# Patient Record
Sex: FEMALE | Race: WHITE
[De-identification: names, ages, dates, MRNs, and addresses within clinical notes are randomized per-mention and may not be internally consistent; named-entity substitution may affect disease eponyms.]

---

## 2019-06-02 ENCOUNTER — HOSPITAL ENCOUNTER (INPATIENT)
Dept: HOSPITAL 54 - TELE1 | Age: 59
LOS: 3 days | Discharge: HOME | DRG: 201 | End: 2019-06-05
Attending: INTERNAL MEDICINE | Admitting: INTERNAL MEDICINE
Payer: COMMERCIAL

## 2019-06-02 VITALS — BODY MASS INDEX: 29.82 KG/M2 | HEIGHT: 67 IN | WEIGHT: 190 LBS

## 2019-06-02 DIAGNOSIS — E66.9: ICD-10-CM

## 2019-06-02 DIAGNOSIS — E78.5: ICD-10-CM

## 2019-06-02 DIAGNOSIS — I48.0: Primary | ICD-10-CM

## 2019-06-02 DIAGNOSIS — F41.9: ICD-10-CM

## 2019-06-02 DIAGNOSIS — E87.6: ICD-10-CM

## 2019-06-02 PROCEDURE — G0378 HOSPITAL OBSERVATION PER HR: HCPCS

## 2019-06-02 NOTE — NUR
TELE RN OPENING NOTES



PATIENT D/A FROM [facility], PT ARRIVED VIA AMBULANCE ALONG WITH HER MOTHER. PT A/O 
X3, ABLE TO MAKE NEEDS KNOWN. DENIES ANY PAIN OR SOB. ON RA, RR EVEN AND UNLABORED. PATIENT 
PLACED ON TELE MONITOR SR WITH HR 70S. VITAL SIGNS: /75, HR 73, TEMP 98, RR 18, SPO2 
96%. IV SITE LEFT FA 22G, PATENT AND INTACT, SITE C/D/I. PT REFUSED BEDBATH. PATIENT 
ORIENTED TO ROOM. MRSA SWAB DONE. PHYSICAL ASSESSMENT DONE AND RECORDED. PATIENT AND FAMILY 
MADE COMFORTABLE. SAFETY MEASURES IN PLACE. CALL LIGHT WITHIN REACH. BED LOCKED AND IN 
LOWEST POSITION. PER PATIENT SHE IS THE PRIMARY AND ONLY CAREGIVER TO HER MOM, NOBODY CAN 
TAKE CARE OF HER MOM, PATIENT STATED HER MOM NEEDS TO STAY WITH HER AT BEDSIDE, MOTHER 
DEPENDENT, CHARGE NURSE MADE AWARE AND MD MADE AWARE OF ADMISSION, PT STATED SHE WANTS 
DNR/DNI CODE STATUS. MD MADE AWARE. AWAITING FOR ADMITTING ORDERS. WILL MONITOR PT CLOSELY.

## 2019-06-03 VITALS — DIASTOLIC BLOOD PRESSURE: 74 MMHG | SYSTOLIC BLOOD PRESSURE: 117 MMHG

## 2019-06-03 VITALS — SYSTOLIC BLOOD PRESSURE: 103 MMHG | DIASTOLIC BLOOD PRESSURE: 68 MMHG

## 2019-06-03 VITALS — DIASTOLIC BLOOD PRESSURE: 75 MMHG | SYSTOLIC BLOOD PRESSURE: 115 MMHG

## 2019-06-03 VITALS — DIASTOLIC BLOOD PRESSURE: 62 MMHG | SYSTOLIC BLOOD PRESSURE: 93 MMHG

## 2019-06-03 VITALS — SYSTOLIC BLOOD PRESSURE: 119 MMHG | DIASTOLIC BLOOD PRESSURE: 74 MMHG

## 2019-06-03 VITALS — DIASTOLIC BLOOD PRESSURE: 76 MMHG | SYSTOLIC BLOOD PRESSURE: 108 MMHG

## 2019-06-03 VITALS — DIASTOLIC BLOOD PRESSURE: 69 MMHG | SYSTOLIC BLOOD PRESSURE: 112 MMHG

## 2019-06-03 LAB
ALBUMIN SERPL BCP-MCNC: 3.7 G/DL (ref 3.4–5)
ALP SERPL-CCNC: 68 U/L (ref 46–116)
ALT SERPL W P-5'-P-CCNC: 50 U/L (ref 12–78)
AST SERPL W P-5'-P-CCNC: 19 U/L (ref 15–37)
BASOPHILS # BLD AUTO: 0.1 /CMM (ref 0–0.2)
BASOPHILS NFR BLD AUTO: 0.9 % (ref 0–2)
BILIRUB SERPL-MCNC: 0.9 MG/DL (ref 0.2–1)
BUN SERPL-MCNC: 14 MG/DL (ref 7–18)
CALCIUM SERPL-MCNC: 9.1 MG/DL (ref 8.5–10.1)
CHLORIDE SERPL-SCNC: 105 MMOL/L (ref 98–107)
CHOLEST SERPL-MCNC: 209 MG/DL (ref ?–200)
CO2 SERPL-SCNC: 26 MMOL/L (ref 21–32)
CREAT SERPL-MCNC: 0.9 MG/DL (ref 0.6–1.3)
EOSINOPHIL NFR BLD AUTO: 0.3 % (ref 0–6)
GLUCOSE SERPL-MCNC: 97 MG/DL (ref 74–106)
HCT VFR BLD AUTO: 44 % (ref 33–45)
HDLC SERPL-MCNC: 46 MG/DL (ref 40–60)
HGB BLD-MCNC: 14.7 G/DL (ref 11.5–14.8)
LDLC SERPL DIRECT ASSAY-MCNC: 141 MG/DL (ref 0–99)
LYMPHOCYTES NFR BLD AUTO: 2.1 /CMM (ref 0.8–4.8)
LYMPHOCYTES NFR BLD AUTO: 24.1 % (ref 20–44)
MAGNESIUM SERPL-MCNC: 2.1 MG/DL (ref 1.8–2.4)
MCHC RBC AUTO-ENTMCNC: 34 G/DL (ref 31–36)
MCV RBC AUTO: 90 FL (ref 82–100)
MONOCYTES NFR BLD AUTO: 0.6 /CMM (ref 0.1–1.3)
MONOCYTES NFR BLD AUTO: 6.6 % (ref 2–12)
NEUTROPHILS # BLD AUTO: 5.8 /CMM (ref 1.8–8.9)
NEUTROPHILS NFR BLD AUTO: 68.1 % (ref 43–81)
PHOSPHATE SERPL-MCNC: 3.5 MG/DL (ref 2.5–4.9)
PLATELET # BLD AUTO: 251 /CMM (ref 150–450)
POTASSIUM SERPL-SCNC: 3.4 MMOL/L (ref 3.5–5.1)
PROT SERPL-MCNC: 7.6 G/DL (ref 6.4–8.2)
RBC # BLD AUTO: 4.83 MIL/UL (ref 4–5.2)
SODIUM SERPL-SCNC: 141 MMOL/L (ref 136–145)
TRIGL SERPL-MCNC: 116 MG/DL (ref 30–150)
TSH SERPL DL<=0.005 MIU/L-ACNC: 2.93 UIU/ML (ref 0.36–3.74)
WBC NRBC COR # BLD AUTO: 8.5 K/UL (ref 4.3–11)

## 2019-06-03 RX ADMIN — RIVAROXABAN SCH MG: 10 TABLET, FILM COATED ORAL at 17:03

## 2019-06-03 RX ADMIN — METOPROLOL TARTRATE SCH MG: 25 TABLET, FILM COATED ORAL at 17:03

## 2019-06-03 NOTE — NUR
RN TELE CLOSING NOTES



PATIENT AWAKE IN BED A/O X3 NO SIGNS OR SYMPTOMS OF RESPIRATORY DISTRESS OR ACUTE PAIN. ON 
TELE MONITOR SR WITH HR 60S. IV SALINE LOCK TO LFA# 20 GAUGE. PATIENT IS CARE GIVER TO ELDER 
MOTHER WHO IS SITTING AT BEDSIDE. SAFETY PRECAUTIONS IN PLACE, BED IN LOW POSITION, CALL 
LIGHT WITHIN REACH. NO ACUTE CHANGES THROUGHOUT SHIFT. ALL MD ORDERS ATTENDED. ENDORSED TO 
AM RN FOR KAY.

## 2019-06-03 NOTE — NUR
RN

PT ADMITTED FROM [facility] , PT ARRIVED BY AMBULANCE ALONG WITH HER MOTHER. PER 
PATIENT SHE IS THE PRIMARY AND ONLY CAREGIVER TO HER MOM, NOBODY CAN TAKE CARE OF HER MOM , 
PATIENT STATED HER MOM NEEDS TO STAY WITH HER AT BEDSIDE, MOTHER DEPENDENT , NURSING 
SUPERVISOR DARLYN MADE AWARE, STATED WE HAVE TO KEEP HER AT BEDSIDE FOR NOW AND WILL FOLLOW 
UP IN AM WITH . MD PAZ MADE AWARE OF ADMISSION, PT STATED SHE WANTS 
DNR/DNI CODE STATUS. MD MADE AWARE.

## 2019-06-03 NOTE — NUR
RN TELE NOTES



NO SIGNIFICANT CHANGES THROUGHOUT THE SHIFT NO EPISODES OF AFIB SINUS ON TELE MONITOR IN THE 
60'S. STARTED ON 20 MG XARELTO PER CARDIOLOGIST. NO SIGNS OR SYMPTOMS OF RESPIRATORY 
DISTRESS OR ACUTE PAIN. SATURATING UPPER 90'S ON ROOM AIR. PATIENT STATED HAVING EPISODES OF 
PALPATIONS POSSIBLY ANXIETY RELATED ORDER FOR PRN ATIVAN . IV SALINE LOCK TO LFA #20 GAUGE. 
POTASSIUM REPLACE X1 TAB. ASSISTED TO BATHROOM APPETITE GOOD. PATIENT IS PRIMARY CARE GIVER 
OF MOTHER WHO IS AT BEDSIDE SAFETY PRECAUTIONS IN PLACE BED IN LOW POSITION SCD PUMPS 
APPLIED. ABLE TO MAKE NEEDS KNOWN AND ALL MET BY STAFF.

## 2019-06-03 NOTE — NUR
RN TELE OPENING NOTES



RECEIVED BEDSIDE REPORT. PATIENT AWAKE IN BED A/O X3 NO SIGNS OR SYMPTOMS OF RESPIRATORY 
DISTRESS OR ACUTE PAIN. SINUS ON THE MONITOR IV SALINE LOCK TO LFA# 20 GAUGE. PATIENT IS 
CARE GIVER TO ELDER MOTHER WHO IS SITTING AT BEDSIDE. SAFETY PRECAUTIONS IN PLCE BED IN LOW 
POSITION CALL LIGHT WITHIN REACH WILL CONT TO MONITOR ACCORDINGLY

## 2019-06-03 NOTE — NUR
RN TELE NOTES



PATIENT SEEN BY HOSPITALIST AND CARDIOLOGIST. NEW RX FOR AFIB PRESCRIBED. PATIENT WALKED ON 
UNIT WITH NO EPISODES OF AFIB. WILL CONT TO MONITOR FOR EPISODES OF AFIB

## 2019-06-03 NOTE — NUR
Social service consult requested by Dr. Gatica due to elderly mother at bedside of pt. 
unable to care for self. Pt. is a 58 year old female who was admitted to [facility] for Atrial 
Fibrillation. KATHRYN met with pt. and her mother Maddie bedside. Pt. is alert and oriented x 4. 
Pt. states she lives with her elderly mother at [address] [place] [address] in [place]. [place]. Pt. 
states she is her mom's caregiver. Pt. is not employed. Primary income for the family  is 
pt's mother Maddie's social security income. Pt. states they have no friends or family to 
assist in times of emergency, therefore, she has to bring her mother with her to the 
hospital. Pt's mother has [employer] insurance. KATHRYN informed her to contact's Maddie's insurance 
to see if they can assist the mother with her care at home. KATHRYN also offered to apply for 
[employer] for the pt. however, pt. states, she will let SW know if she wants her to initiate the 
application. KATHRYN updated  Carmen Menard with the aforementioned information.

## 2019-06-04 VITALS — SYSTOLIC BLOOD PRESSURE: 108 MMHG | DIASTOLIC BLOOD PRESSURE: 68 MMHG

## 2019-06-04 VITALS — DIASTOLIC BLOOD PRESSURE: 72 MMHG | SYSTOLIC BLOOD PRESSURE: 108 MMHG

## 2019-06-04 VITALS — DIASTOLIC BLOOD PRESSURE: 67 MMHG | SYSTOLIC BLOOD PRESSURE: 117 MMHG

## 2019-06-04 VITALS — DIASTOLIC BLOOD PRESSURE: 54 MMHG | SYSTOLIC BLOOD PRESSURE: 96 MMHG

## 2019-06-04 VITALS — SYSTOLIC BLOOD PRESSURE: 105 MMHG | DIASTOLIC BLOOD PRESSURE: 69 MMHG

## 2019-06-04 VITALS — SYSTOLIC BLOOD PRESSURE: 116 MMHG | DIASTOLIC BLOOD PRESSURE: 79 MMHG

## 2019-06-04 LAB
BASOPHILS # BLD AUTO: 0 /CMM (ref 0–0.2)
BASOPHILS NFR BLD AUTO: 0.5 % (ref 0–2)
BUN SERPL-MCNC: 22 MG/DL (ref 7–18)
CALCIUM SERPL-MCNC: 9.2 MG/DL (ref 8.5–10.1)
CHLORIDE SERPL-SCNC: 104 MMOL/L (ref 98–107)
CO2 SERPL-SCNC: 22 MMOL/L (ref 21–32)
CREAT SERPL-MCNC: 0.8 MG/DL (ref 0.6–1.3)
EOSINOPHIL NFR BLD AUTO: 0.9 % (ref 0–6)
GLUCOSE SERPL-MCNC: 87 MG/DL (ref 74–106)
HCT VFR BLD AUTO: 43 % (ref 33–45)
HGB BLD-MCNC: 14.6 G/DL (ref 11.5–14.8)
LYMPHOCYTES NFR BLD AUTO: 1.9 /CMM (ref 0.8–4.8)
LYMPHOCYTES NFR BLD AUTO: 24.5 % (ref 20–44)
MAGNESIUM SERPL-MCNC: 2.2 MG/DL (ref 1.8–2.4)
MCHC RBC AUTO-ENTMCNC: 34 G/DL (ref 31–36)
MCV RBC AUTO: 90 FL (ref 82–100)
MONOCYTES NFR BLD AUTO: 0.5 /CMM (ref 0.1–1.3)
MONOCYTES NFR BLD AUTO: 6.7 % (ref 2–12)
NEUTROPHILS # BLD AUTO: 5.3 /CMM (ref 1.8–8.9)
NEUTROPHILS NFR BLD AUTO: 67.4 % (ref 43–81)
PHOSPHATE SERPL-MCNC: 4 MG/DL (ref 2.5–4.9)
PLATELET # BLD AUTO: 247 /CMM (ref 150–450)
POTASSIUM SERPL-SCNC: 4.1 MMOL/L (ref 3.5–5.1)
RBC # BLD AUTO: 4.81 MIL/UL (ref 4–5.2)
SODIUM SERPL-SCNC: 139 MMOL/L (ref 136–145)
WBC NRBC COR # BLD AUTO: 7.9 K/UL (ref 4.3–11)

## 2019-06-04 RX ADMIN — METOPROLOL TARTRATE SCH MG: 25 TABLET, FILM COATED ORAL at 11:59

## 2019-06-04 RX ADMIN — METOPROLOL TARTRATE SCH MG: 25 TABLET, FILM COATED ORAL at 00:27

## 2019-06-04 RX ADMIN — METOPROLOL TARTRATE SCH MG: 25 TABLET, FILM COATED ORAL at 17:12

## 2019-06-04 RX ADMIN — METOPROLOL TARTRATE SCH MG: 25 TABLET, FILM COATED ORAL at 06:00

## 2019-06-04 RX ADMIN — RIVAROXABAN SCH MG: 10 TABLET, FILM COATED ORAL at 17:12

## 2019-06-04 NOTE — NUR
KATHRYN met with pt. and her mother again per her request. Pt's mother was laying in pt's bed 
next to the pt. Pt. appeared anxious and stated she is not able to care for her mother or 
herself at this time. KATHRYN informed pt. that since pt's mother has [employer] insurance, she 
should reach out to them for assistance in care for her mother. Pt. informed SW that she 
herself needs 24/7 care. SW offered to apply for SS for her, however pt. declined stating 
they will not be there 24/7. KATHRYN informed her it depends on the need and [facility]. KATHRYN also offered pt. [employer] [facility] to senior living and 
care which ha assisted living, home care resources, but pt. declined those resources as 
well. Pt. states she will have to call family friend's who are out of state to come out to 
[place] to help her and her mother care for the house. Pt. appeared to get agitated toward KATHRYN 
since the options provided by KATHRYN were not enough for her. Pt. then, requested for her DEVIN Benavidez. KATHRYN updated DEVIN Benavidez with the aforementioned information and informed her that pt. 
would like to speak with her.

## 2019-06-04 NOTE — NUR
RN MS  NOTES



NO SIGNIFICANT CHANGES THROUGHOUT THE SHIFT NO SIGNS OR SYMPTOMS OF RESPIRATORY DISTRESS OR 
ACUTE PAIN. SATURATING UPPER 90'S ON ROOM AIR ATIVAN GIVEN EARLIER IN THE SHIFT FOR 
INCREASED ANXIETY PATIENT SPOKE WITH CARDIOLOGIST, SW AND HOSPITALIST. PATIENT APPROVED FOR 
ONE MORE NIGHT STAY AND MOVED TO ROOM 120 TO ACCOMMODATE BOTH PATIENT AND MOTHER PER LARISA LYNN.SHOWER GIVEN IV SALINE LOCK TO LFA #20 GAUGE ASSISTED TO BATHROOM APPETITE GOOD. 
PATIENT IS PRIMARY CARE GIVER OF MOTHER WHO IS AT BEDSIDE SAFETY PRECAUTIONS IN PLACE BED IN 
LOW POSITION SCD PUMPS APPLIED. ABLE TO MAKE NEEDS KNOWN AND ALL MET BY STAFF.

## 2019-06-04 NOTE — NUR
ALEXANDRE RN OPENING NOTES



RECEIVED PATIENT'S BEDSIDE REPORT. PATIENT IS A/A/OX4 ABLE TO MAKE NEEDS KNOWN ( MOTHER ALSO 
LAYING IN BED WITH PATIENT) NO SIGNS OR SYMPTOMS OF RESPIRATORY DISTRESS OR ACUTE PAIN. IV 
LINE SALINE LOCK TO LFA# 20 GAUGE. PATIENT IS CARE GIVER TO ELDER MOTHER AND HAS NO OTHER 
FAMILY TO CARE FOR HER. SAFETY PRECAUTIONS IN PLACE BED IN LOW POSITION CALL LIGHT WITHIN 
REACH WILL CONT TO MONITOR ACCORDINGLY

## 2019-06-04 NOTE — NUR
KATHRYN HIGH MET WITH PATIENT AT BEDSIDE. LENNOX INFORMED RN THAT PATIENT IS NOT WILLING TO TAKE 
MATERIAL FOR HOME HEALTH AND REQUESTED TO SPEAK WITH SELF/RN. PATIENT IS VERY FRUSTRATED 
WITH THE NON HELP SHE SEEMS TO NOT BE GETTING. I ASKED TH E PATIENT WHAT HER IDEAL PICTURE 
WOULD BE WHEN SHE IS DISCHARGED. PATIENT STATED SHE NEEDS SOMEONE TO HELP HER WITH HER 
AILMENT. THAT SHE FEELS LIKE SHE MAY DIE IN THE MIDDLE OF THE NIGHT. I GOT FROM OUR 
CONVERSATION THAT THE PATIENT IS FRUSTRATED AND NEEDS MORE EDUCATION ON HER DIAGNOSIS. 
PATIENT FEELS SHE IS UNABLE TO TAKE CARE OF HER MOTHER BECAUSE OF HER ILLNESS. WILL INFORM 
MD IN REGARDS TO POSSIBLE PSYCH EVALUATION. PATIENT STRIKING OUT VERBALLY AT MOTHER WHEN 
TRYING TO SPEAK WHICH ALSO GROWS A CONCERN

## 2019-06-04 NOTE — NUR
TELE RN NOTES



HOLD METOPROLOL 25MG PO FOR NOW DUE TO DECREASED BP 96/54 AND HR 54-55. WILL CONT TO MONITOR 
PT.

## 2019-06-04 NOTE — NUR
RN NOTES



LEFT MESSAGE WITH LENNOX  PATIENT IS WORRIED ABOUT HER LIVING SITUATION WHEN 
DISCHARGED

## 2019-06-04 NOTE — NUR
RN TELE OPENING NOTES



RECEIVED BEDSIDE REPORT. PATIENT ASLEEP ABLE TO AROUSE WITH VOICE AND TOUCH IN BED A/O X3( 
MOTHER ALSO LAYING IN BED WITH PATIENT) NO SIGNS OR SYMPTOMS OF RESPIRATORY DISTRESS OR 
ACUTE PAIN. SINUS ON THE MONITOR IV SALINE LOCK TO LFA# 20 GAUGE. PATIENT IS CARE GIVER TO 
ELDER MOTHER AND HAS NO OTHER FAMILY TO CARE FOR HER. SAFETY PRECAUTIONS IN PLACE BED IN LOW 
POSITION CALL LIGHT WITHIN REACH WILL CONT TO MONITOR ACCORDINGLY

## 2019-06-05 VITALS — SYSTOLIC BLOOD PRESSURE: 92 MMHG | DIASTOLIC BLOOD PRESSURE: 52 MMHG

## 2019-06-05 VITALS — DIASTOLIC BLOOD PRESSURE: 60 MMHG | SYSTOLIC BLOOD PRESSURE: 112 MMHG

## 2019-06-05 VITALS — SYSTOLIC BLOOD PRESSURE: 105 MMHG | DIASTOLIC BLOOD PRESSURE: 62 MMHG

## 2019-06-05 LAB
BUN SERPL-MCNC: 22 MG/DL (ref 7–18)
CALCIUM SERPL-MCNC: 8.9 MG/DL (ref 8.5–10.1)
CHLORIDE SERPL-SCNC: 103 MMOL/L (ref 98–107)
CO2 SERPL-SCNC: 24 MMOL/L (ref 21–32)
CREAT SERPL-MCNC: 0.8 MG/DL (ref 0.6–1.3)
GLUCOSE SERPL-MCNC: 76 MG/DL (ref 74–106)
POTASSIUM SERPL-SCNC: 3.9 MMOL/L (ref 3.5–5.1)
SODIUM SERPL-SCNC: 140 MMOL/L (ref 136–145)

## 2019-06-05 RX ADMIN — METOPROLOL TARTRATE SCH MG: 25 TABLET, FILM COATED ORAL at 06:00

## 2019-06-05 RX ADMIN — METOPROLOL TARTRATE SCH MG: 25 TABLET, FILM COATED ORAL at 11:04

## 2019-06-05 RX ADMIN — METOPROLOL TARTRATE SCH MG: 25 TABLET, FILM COATED ORAL at 00:45

## 2019-06-05 NOTE — NUR
patient discharge via wheelchair with mother with patient, charge nurse has arranged for 
home health to come and follow up with patient and will follow up with patient cardiologist 
and primary care physician for possible ablation. and follow up with medications. discharge 
in stable condition with instruction given by the charge sameera with home health follow up 
at home

## 2019-06-05 NOTE — NUR
charge nurse talked to the patient about medications and instructions.will see her physician 
in the morning.medications called to the pharmacy. 

-------------------------------------------------------------------------------

Addendum: 06/05/19 at 1256 by SUMA LOWRY RN

-------------------------------------------------------------------------------

patient is the caregiver of her mom and inform,ed the physician about it. lico brasher is 
aware and claims everything is taken cared of

## 2019-06-05 NOTE — NUR
LARISA THE ON CALL FOR DR MICHELLE MEJIA AND INFORMED THAT THE PATIENT IS HAVING PALPITATIONS 
AND BRADYCARDIEC THAT WENT DOWN TO 54/MIN IN THE NOGHT SHIFT, INFORMED THE PATIENT  WANTS 
THE MONITOR TO BE PLACED BACK AND THAT SHE IS CONSERNED ABOUT HER EYES GETTING RED AND SHE 
IS CONS=CERNED BECAUSE SHE IS IN XARELTO. VOIDED, NO BLOOD NOTED. NO FURTHER ORDERS MADE.

## 2019-06-05 NOTE — NUR
RN NOTES



ENDORSED PATIENT CARE REPORT TO AM SHIFT RN FOR CONTINUITY OF CARE. PATIENT IS NOT IN ANY 
DISTRESS. ALL PATIENT NEEDS ARE ATTENDED AND MET. SAFETY MEASURES IN PLACE AT ALL TIME. CALL 
LIGHT PLACED WITHIN REACH.

## 2019-06-05 NOTE — NUR
patient having palpitations. vital signs checked . with complants of redness on the eye and 
will inform the physician. with orders for transfer to medical surgical and will inform the 
physician that the patient is having palpitations

-------------------------------------------------------------------------------

Addendum: 06/05/19 at 0919 by SUMA LOWRY RN

-------------------------------------------------------------------------------

with mother at bedside,for the patient is the mothers caregiver and no one to take care of 
the mother. physician is aware.

## 2019-06-05 NOTE — NUR
charge nurse is informed that the patient is concerned she cannot take care of herself so 
cannot take care of mother, requested a case management consult.a new onset atrail 
fibrillation and will be on blood thinners for the first time and will need a case 
management and  for the care, charge nurse is informed about the patient safety 
and needs a case management and  involvement and needs home health to follow up 
with the patient , charge nurse said she whas home health arranged.

## 2019-06-05 NOTE — NUR
INSTRUCTION GIVEN BY THE CHARGE NURSE AND METROPOLOL DOSE GIVEN  AS THE HEART RATE IS 
EELVATED. FOR DISCHARGE, AWAITING THE CAB TO TAKE PATIENT HOME.